# Patient Record
Sex: MALE | Race: WHITE | NOT HISPANIC OR LATINO | Employment: FULL TIME | URBAN - METROPOLITAN AREA
[De-identification: names, ages, dates, MRNs, and addresses within clinical notes are randomized per-mention and may not be internally consistent; named-entity substitution may affect disease eponyms.]

---

## 2020-08-31 ENCOUNTER — HOSPITAL ENCOUNTER (EMERGENCY)
Facility: HOSPITAL | Age: 30
Discharge: HOME | End: 2020-08-31
Payer: COMMERCIAL

## 2020-08-31 VITALS
HEIGHT: 73 IN | BODY MASS INDEX: 21.87 KG/M2 | DIASTOLIC BLOOD PRESSURE: 91 MMHG | OXYGEN SATURATION: 99 % | WEIGHT: 165 LBS | SYSTOLIC BLOOD PRESSURE: 117 MMHG | RESPIRATION RATE: 18 BRPM | TEMPERATURE: 98 F | HEART RATE: 74 BPM
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
## 2020-08-31 PROCEDURE — 25000003 PHARM REV CODE 250: Performed by: PHYSICIAN ASSISTANT

## 2020-08-31 PROCEDURE — 99283 EMERGENCY DEPT VISIT LOW MDM: CPT

## 2020-08-31 RX ORDER — DOXYCYCLINE 100 MG/1
200 CAPSULE ORAL DAILY
Qty: 2 CAPSULE | Refills: 0 | Status: SHIPPED | OUTPATIENT
Start: 2020-08-31 | End: 2020-09-01

## 2020-08-31 RX ORDER — LIDOCAINE HYDROCHLORIDE 10 MG/ML
20 INJECTION INFILTRATION; PERINEURAL ONCE
Status: COMPLETED | OUTPATIENT
Start: 2020-08-31 | End: 2020-08-31

## 2020-08-31 RX ADMIN — LIDOCAINE HYDROCHLORIDE 20 ML: 10 INJECTION, SOLUTION INFILTRATION; PERINEURAL at 12:08

## 2020-08-31 NOTE — ED PROVIDER NOTES
Please note that my documentation in this Electronic Healthcare Record was produced using speech recognition software and therefore may contain errors related to that software.These could include grammar, punctuation and spelling errors or the inclusion/ exclusion of phrases that were not intended. Please contact myself for any clarification, questions or concerns.    HPI: Patient is a 30 y.o. male who presents with the chief complaint of tick bite to the left leg about a week ago.  Patient states he was out fishing and then working in the [employer].  He noticed a tick on the left lower extremity about a day later.  He did remove it but unsure whether the head was attached to the body.  Does not think the tick was moving.  States he has been fine but has been having some itching and pain around the tick bite site.  Denies any numbness or tingling, fever chills, joint pain, other rashes.  He did clean the area out with some peroxide.    REVIEW OF SYSTEMS - 10 systems were independently reviewed and are otherwise negative with the exception of those items previously documented in the HPI and nursing notes.    Allergy: Pcn [penicillins]    Past medical history: History reviewed. No pertinent past medical history.    Surgical History: History reviewed. No pertinent surgical history.    Social history:   Social History     Socioeconomic History    Marital status:      Spouse name: Not on file    Number of children: Not on file    Years of education: Not on file    Highest education level: Not on file   Occupational History    Not on file   Social Needs    Financial resource strain: Not on file    Food insecurity     Worry: Not on file     Inability: Not on file    Transportation needs     Medical: Not on file     Non-medical: Not on file   Tobacco Use    Smoking status: Current Some Day Smoker   Substance and Sexual Activity    Alcohol use: Yes    Drug use: Not Currently    Sexual activity: Yes   Lifestyle  "   Physical activity     Days per week: Not on file     Minutes per session: Not on file    Stress: Not on file   Relationships    Social connections     Talks on phone: Not on file     Gets together: Not on file     Attends Jain service: Not on file     Active member of club or organization: Not on file     Attends meetings of clubs or organizations: Not on file     Relationship status: Not on file   Other Topics Concern    Not on file   Social History Narrative    Not on file       Family history: non-contributory    EHR: reviewed    Vitals: BP (!) 117/91 (BP Location: Left arm, Patient Position: Sitting)   Pulse 74   Temp 98.1 °F (36.7 °C) (Oral)   Resp 18   Ht 6' 1" (1.854 m)   Wt 74.8 kg (165 lb)   SpO2 99%   BMI 21.77 kg/m²     PHYSICAL EXAM:    General-30-year-old male awake and alert, oriented, GCS 15, in no acute distress,  HEENT- normocephalic, atraumatic, sclera anicteric, moist mucous membranes, PERRL, EOMI  CARDIOVASCULAR- regular rate and rhythm  PULMONARY- nonlabored, no respiratory distress  NEUROLOGIC- mental status normal, speech fluid, cognition normal, CN II-XII grossly intact, sensations equal normal bilateral upper and lower extremities, peripheral pulse 2 +/4, ambulatory with proper gait.  MUSCULOSKELETAL- well-nourished, well-developed, full range of motion of the left ankle phalangeal joints.  There is a 1.0 cm papule without surrounding erythema or increased warmth.  No active drainage.  There is a hyperpigmented portion on the center of the papule concerning for foreign body vs scab  DERMATOLOGIC- warm and dry, no visible rashes  PSYCHIATRIC- normal affect, normal concentration           Labs Reviewed - No data to display    No orders to display    1.0 cm papule.  Area prepped with Betadine solution.  Approximately 2 cubic centimeters of plain lidocaine solution was used to anesthetize the area with good anesthetic result.  3 mm linear incision was made in the central area " of to help unroof the superficial layer.  No obvious foreign body noted.  Bandage was placed over the wound.  Patient was given wound care instructions. Patient tolerated procedure well and no complications or blood loss.     MEDICAL DECISION MAKING: Patient is a 30 y.o. male who presented with chief complaint of tick bite to the left lower extremity.  Has been having increasing pain as well some itching in the area.  Denies any other systemic symptoms.  No extremity weakness or paresthesias.  On examination, he does have a papule on the left lower leg without any obvious signs of cellulitis or abscess formation.  There was a hyperpigmented portion on the center of the papule which was concerning for possible retained tick head.  I did unroof that area and did not see any obvious foreign body.  Patient did tolerate the procedure well.  He is given a dose of doxycycline for prophylaxis and advised to watch for signs of infection.  No obvious signs of Lyme disease or [place] fever at this time.  Patient will be discharged home in stable condition.    CLINICAL IMPRESSION:  1. Tick bite, initial encounter         ROSE Fernandes  08/31/20 6911

## 2020-08-31 NOTE — ED TRIAGE NOTES
Pt alleges bit by a tick on Tuesday.  He alleges he removed the head of tick.  Now it is red with black center.

## 2025-01-29 ENCOUNTER — HOSPITAL ENCOUNTER (EMERGENCY)
Facility: HOSPITAL | Age: 35
Discharge: HOME | End: 2025-01-29
Payer: COMMERCIAL

## 2025-01-29 VITALS
BODY MASS INDEX: 22.53 KG/M2 | SYSTOLIC BLOOD PRESSURE: 138 MMHG | HEART RATE: 84 BPM | DIASTOLIC BLOOD PRESSURE: 66 MMHG | HEIGHT: 73 IN | TEMPERATURE: 98 F | WEIGHT: 170 LBS | OXYGEN SATURATION: 100 % | RESPIRATION RATE: 18 BRPM

## 2025-01-29 DIAGNOSIS — J06.9 VIRAL URI WITH COUGH: Primary | ICD-10-CM

## 2025-01-29 LAB
GROUP A STREP, MOLECULAR: NEGATIVE
INFLUENZA A, MOLECULAR: NEGATIVE
INFLUENZA B, MOLECULAR: NEGATIVE
SARS-COV-2 RDRP RESP QL NAA+PROBE: NEGATIVE
SPECIMEN SOURCE: NORMAL

## 2025-01-29 PROCEDURE — 87502 INFLUENZA DNA AMP PROBE: CPT | Performed by: STUDENT IN AN ORGANIZED HEALTH CARE EDUCATION/TRAINING PROGRAM

## 2025-01-29 PROCEDURE — 99283 EMERGENCY DEPT VISIT LOW MDM: CPT

## 2025-01-29 PROCEDURE — 87635 SARS-COV-2 COVID-19 AMP PRB: CPT | Performed by: STUDENT IN AN ORGANIZED HEALTH CARE EDUCATION/TRAINING PROGRAM

## 2025-01-29 PROCEDURE — 87651 STREP A DNA AMP PROBE: CPT | Performed by: STUDENT IN AN ORGANIZED HEALTH CARE EDUCATION/TRAINING PROGRAM

## 2025-01-29 RX ORDER — BENZONATATE 200 MG/1
200 CAPSULE ORAL 3 TIMES DAILY PRN
Qty: 21 CAPSULE | Refills: 0 | Status: SHIPPED | OUTPATIENT
Start: 2025-01-29 | End: 2025-02-05

## 2025-01-30 NOTE — ED PROVIDER NOTES
Encounter Date: 1/29/2025       History     Chief Complaint   Patient presents with    General Illness     Pt reports sore throat,congestion,and fever starting yesterday. Pt reports son dx with strep.     Patient reports upper respiratory symptoms to include cough, runny nose, sore throat and low-grade fever of 100 starting yesterday.  He reports he was able to work yesterday but symptoms progressively worsening today presents to ED for evaluation of flu-like symptoms.    The history is provided by the patient.     Review of patient's allergies indicates:   Allergen Reactions    Pcn [penicillins]      History reviewed. No pertinent past medical history.  History reviewed. No pertinent surgical history.  No family history on file.  Social History     Tobacco Use    Smoking status: Some Days   Substance Use Topics    Alcohol use: Yes    Drug use: Not Currently     Review of Systems   Constitutional:  Positive for chills, fatigue and fever.   HENT:  Positive for postnasal drip, rhinorrhea and sore throat.    Respiratory:  Positive for cough. Negative for shortness of breath.    Cardiovascular:  Negative for chest pain.   Gastrointestinal: Negative.    Genitourinary: Negative.    Musculoskeletal:  Positive for myalgias.   Skin:  Negative for rash.   Neurological:  Positive for headaches. Negative for dizziness and weakness.       Physical Exam     Initial Vitals [01/29/25 1720]   BP Pulse Resp Temp SpO2   138/66 84 18 98 °F (36.7 °C) 100 %      MAP       --       Nursing Notes and Vital Signs reviewed.    Constitutional: Nontoxic appearing patient in no acute distress.   Head: Normocephalic. Atraumatic.   Eyes:  Conjunctivae are not pale. No scleral icterus.   ENT: Mucous membranes moist.  TMs clear.  Mild erythema posterior oropharynx without exudates  Neck: Supple.   Cardiovascular: Regular rate and rhythm.   Pulmonary: Breath sounds clear thought  No respiratory distress.   Musculoskeletal: Moves all extremities. No  obvious deformities.   Skin: Warm and dry.   Neurological:  Alert, awake, and appropriate. Normal speech. No acute lateralizing neurologic deficits appreciated.   Psychiatric: Normal affect.     Physical Exam  Nursing Notes and Vital Signs reviewed.    Constitutional: Nontoxic appearing patient in no acute distress.   Head: Normocephalic. Atraumatic.   Eyes:  Conjunctivae are not pale. No scleral icterus.   ENT: Mucous membranes moist.  TMs clear.  Mild erythema posterior oropharynx without exudates.  Neck: Supple.   Cardiovascular: Regular rate and rhythm.    Pulmonary: Breath sounds clear thought  No respiratory distress.  No wheezing rales or rhonchi  Abdominal: Soft, Non-distended  Musculoskeletal: Moves all extremities. No obvious deformities.   Skin: Warm and dry.   Neurological:  Alert, awake, and appropriate. Normal speech. No acute lateralizing neurologic deficits appreciated.   Psychiatric: Normal affect.     ED Course   Procedures  Labs Reviewed   INFLUENZA A & B BY MOLECULAR       Result Value    Influenza A, Molecular Negative      Influenza B, Molecular Negative      Flu A & B Source Nasal Swab     GROUP A STREP, MOLECULAR    Group A Strep, Molecular Negative     SARS-COV-2 RNA AMPLIFICATION, QUAL    SARS-CoV-2 RNA, Amplification, Qual Negative            Imaging Results    None          Medications - No data to display  Medical Decision Making  Risk  Prescription drug management.                                      Clinical Impression:  Final diagnoses:  [J06.9] Viral URI with cough (Primary)          ED Disposition Condition    Discharge Stable          ED Prescriptions       Medication Sig Dispense Start Date End Date Auth. Provider    benzonatate (TESSALON) 200 MG capsule Take 1 capsule (200 mg total) by mouth 3 (three) times daily as needed for Cough. 21 capsule 1/29/2025 2/5/2025 Ludmila Mercedes, NP          Follow-up Information       Follow up With Specialties Details Why Contact Info     Your primary care physician        [facility] Emergency Medicine  As needed, If symptoms worsen [address] [facility] [address] [phone]             Ludmila Mercedes, NP  01/29/25 1165